# Patient Record
Sex: FEMALE | Race: OTHER | HISPANIC OR LATINO
[De-identification: names, ages, dates, MRNs, and addresses within clinical notes are randomized per-mention and may not be internally consistent; named-entity substitution may affect disease eponyms.]

---

## 2020-04-22 ENCOUNTER — APPOINTMENT (OUTPATIENT)
Dept: ANTEPARTUM | Facility: CLINIC | Age: 25
End: 2020-04-22
Payer: MEDICAID

## 2020-04-22 ENCOUNTER — ASOB RESULT (OUTPATIENT)
Age: 25
End: 2020-04-22

## 2020-04-22 PROBLEM — Z00.00 ENCOUNTER FOR PREVENTIVE HEALTH EXAMINATION: Status: ACTIVE | Noted: 2020-04-22

## 2020-04-22 PROCEDURE — 76801 OB US < 14 WKS SINGLE FETUS: CPT

## 2020-06-24 ENCOUNTER — APPOINTMENT (OUTPATIENT)
Dept: ANTEPARTUM | Facility: CLINIC | Age: 25
End: 2020-06-24
Payer: MEDICAID

## 2020-06-24 ENCOUNTER — ASOB RESULT (OUTPATIENT)
Age: 25
End: 2020-06-24

## 2020-06-24 PROCEDURE — 76805 OB US >/= 14 WKS SNGL FETUS: CPT

## 2020-07-27 ENCOUNTER — APPOINTMENT (OUTPATIENT)
Dept: MATERNAL FETAL MEDICINE | Facility: CLINIC | Age: 25
End: 2020-07-27
Payer: MEDICAID

## 2020-07-27 ENCOUNTER — APPOINTMENT (OUTPATIENT)
Dept: ANTEPARTUM | Facility: CLINIC | Age: 25
End: 2020-07-27

## 2020-07-27 VITALS
HEART RATE: 88 BPM | OXYGEN SATURATION: 99 % | HEIGHT: 59 IN | RESPIRATION RATE: 18 BRPM | WEIGHT: 175 LBS | BODY MASS INDEX: 35.28 KG/M2 | DIASTOLIC BLOOD PRESSURE: 58 MMHG | SYSTOLIC BLOOD PRESSURE: 90 MMHG

## 2020-07-27 DIAGNOSIS — O34.219 MATERNAL CARE FOR UNSPECIFIED TYPE SCAR FROM PREVIOUS CESAREAN DELIVERY: ICD-10-CM

## 2020-07-27 DIAGNOSIS — Z78.9 OTHER SPECIFIED HEALTH STATUS: ICD-10-CM

## 2020-07-27 DIAGNOSIS — Z86.59 PERSONAL HISTORY OF OTHER MENTAL AND BEHAVIORAL DISORDERS: ICD-10-CM

## 2020-07-27 DIAGNOSIS — Z86.39 PERSONAL HISTORY OF OTHER ENDOCRINE, NUTRITIONAL AND METABOLIC DISEASE: ICD-10-CM

## 2020-07-27 PROCEDURE — 99205 OFFICE O/P NEW HI 60 MIN: CPT | Mod: TH

## 2020-07-27 RX ORDER — PRENATAL VIT NO.130/IRON/FOLIC 27MG-0.8MG
28-0.8 TABLET ORAL DAILY
Refills: 0 | Status: ACTIVE | COMMUNITY
Start: 2020-07-27

## 2020-07-27 NOTE — PAST MEDICAL HISTORY
[HIV Infection] : no HIV [Chlamydial Infections] : no chlamydia [Exposure To Gonorrhea] : no gonorrhea [Syphilis] : no syphilis [Herpes Simplex] : no genital herpes [Human Papilloma Virus Infection] : no genital warts [Hepatitis, B Virus] : no Hepatitis B [Trichomoniasis] : no trichomoniasis [Hepatitis, C Virus] : no Hepatitis C

## 2020-07-27 NOTE — VITALS
[GA= ___ Days] : and [unfilled] day(s) [GA =___ Weeks] : which calculates to a GA of [unfilled] weeks [NINA by LMP (date): ___] : The calculated NINA by LMP is [unfilled] [By LMP] : this is the final NINA [LMP (date): ___] : LMP was on [unfilled]

## 2020-07-27 NOTE — FAMILY HISTORY
[Age 35+ During Pregnancy] : not 35 or over during pregnancy [Reported Family History Of Birth Defects] : no neural tube defect [Mark-Sachs Carrier] : no Mark-Sachs [Family History] : no mental retardation/autism [Reported Family History Of Genetic Disease] : no history of child defect in child of baby father

## 2020-07-27 NOTE — ACTIVE PROBLEMS
[Hypertension] : no hypertension [Diabetes Mellitus] : no diabetes mellitus [Autoimmune Disease] : no autoimmune disease [Heart Disease] : no heart disease [Neurologic Disorder] : no neurologic disorder, no epilepsy [Renal Disease] : no kidney disease, no UTI [Depression] : no depression, no post partum depression [Hepatic Disorder] : no hepatitis, no liver disease [Psychiatric Disorders] : no psychiatric disorders [Thrombophlebitis] : no varicosities, no phlebitis [Trauma] : no trauma/violence [Blood Transfusion (___ Ml)] : no history of blood transfusion

## 2020-07-28 LAB
T3FREE SERPL-MCNC: 2.36 PG/ML
T4 FREE SERPL-MCNC: 0.9 NG/DL

## 2020-07-28 NOTE — SURGICAL HISTORY
[Abn Paps] : no abnormal pap smears [Breast Disease] : no breast disease [Fibroids] : no fibroids [STI's] : no STI's [Infertility] : no infertility [OC Use] : no OC use [Cysts] : no cysts

## 2020-07-28 NOTE — DISCUSSION/SUMMARY
[FreeTextEntry1] : She is 25 weeks and 2 days gestation by her last menstrual period dates.\par \par She is overweight and obesity has been associated with a number of maternal complications such as gestational diabetes, pre-eclampsia, thrombophlebitis, labor abnormalities, post-term pregnancies,  delivery, and operative complications. Obesity has been associated with adverse fetal outcomes such as late stillbirth and  deliveries.  Obese women also have a two to three-fold increased incidence in congenital anomalies. There were no major fetal malformations seen during the fetal anatomy ultrasound examination. \par \par She told me that she was diagnosed with hypothyroidism approximately one year ago. She takes 50 mcg of levothyroxine daily. She had thyroid function test done on 2020. However, they did not order the correct tests for pregnancy.  She had a TSH level that was reported to be within normal limits, T3 uptake which was low (within normal limits for pregnancy , and a total T3 level that was reported to be within normal limits. I told her that pregnancies complicated by hypothyroidism are at an increased risk for stillbirths. She denies feeling tired or fatigue, having constipation, recent weight gain, feeling cold, and having dry skin. She was advised to have serial thyroid function studies during pregnancy to document that she is euthyroid or adjust her medication. I ordered a free T4, and free T3 level. She was advised to return in approximately 5 weeks for followup [facility] visit. \par \par She had a prior  section delivery. She was informed of the risks and benefits of a trial of labor. She was informed of the risk of uterine rupture and the associated maternal complications such as hysterectomy, blood transfusions, and intensive care unit admission. She was also informed of the associated  adverse outcomes in cases of uterine rupture such as stillbirth, fetal hypoxia and neurological impairment (cerebral palsy). She expressed a desire to have a repeat  section birth with the current pregnancy. \par \par Regarding her premature birth, I informed her that spontaneous  births are multifactorial since various conditions such as  labor,  rupture of membranes, multiple gestations, abruptio placenta, placenta previa, vaginal bleeding, increased or decreased amniotic fluid volume, fetal anomalies, chorioamnionitis, and incompetent cervix are often associated with  births. The clinical risk factors most often associated with spontaneous  birth are genital tract infection, vaginal bleeding during the second trimester, multiple gestations, and a history of previous  birth.  There are four pathologic pathways that have been associated with  labor and delivery.  The four pathways are maternal infection, maternal or fetal stress, overdistention of the uterus, and decidual hemorrhage.  She was advised to not have sexual intercourse or to use a condom during intercourse to reduce the chance of having another  delivery. She was advised to limit her activities. I told her to avoid standing for prolonged periods of time, avoid heavy lifting and bending. She was also advised to avoid stress during the course of the pregnancy. I gave her  labor precautions. She was advised to present to the hospital if she developed pelvic pressure, back pain, copious vaginal discharge, leaking of vaginal fluid, vaginal bleeding, abdominal tightening, and abdominal cramping pain.\par \par She was made aware that she is at increased risk for having another premature birth. She was informed of the availability of weekly progesterone injections to reduce her risk of having another premature infant. She was told that the progesterone injections did not provide complete protection against prematurity.  She was informed that the progesterone injections could produce mild side effects such as injection site soreness, swelling or bruising. I told her that the progesterone injections are usually started between 16 to 20 weeks of gestation and are continued up to 36 weeks of gestation or delivery. I also discussed the results of the Progestin's Role in Optimizing  Gestation (PROLONG) trial which was a double blind, placebo controlled, and international trial enrolling over 1,700 women. The rate of  births < 35 weeks did not differ between the progesterone and placebo groups (11% vs 11.5%; p = 0.7). The rate of  births < 37 weeks was also not significantly different between the progesterone and placebo groups (23.1% vs 21.9%). She was made aware that some medical insurance companies do not cover the cost of progesterone injections. She declined to receive the progesterone injections.  \par \par I recommend a glucola challenge test to screen for gestational diabetes between 24 and 28 weeks gestation. I also recommend the Tdap vaccine between 27 and 36 weeks of gestation to prevent pertussis infection in the  infant. I recommend the flu vaccine during flu season (October through May). She should have serial ultrasounds to evaluate fetal growth and development.  I also suggest fetal surveillance during the third trimester of pregnancy with weekly NSTs or BPPs starting at 36 weeks gestation.  She can also perform daily fetal movement counts as an adjunct to the NSTs or BPPs.\par \par

## 2020-07-28 NOTE — OB HISTORY
[Pregnancy History] : patient received anesthesia [___] : pregnancy complications occured [NINA: ___] : NINA: [unfilled] [LMP: ___] : LMP: [unfilled] [EGA: ___ wks] : EGA: [unfilled] wks [Sonogram] : sonogram [Spontaneous] : Spontaneous conception [at ___ wks] : at [unfilled] weeks [Definite:  ___ (Date)] : the last menstrual period was [unfilled] [Normal Amount/Duration] : was of a normal amount and duration [Menstrual Cramps] : menstrual cramps [Regular Cycle Intervals] : periods have been regular [FreeTextEntry1] : Her prenatal records were not available for review. Some of her prenatal labs were available for review. Thyroid function studies done on April 21, 2020 were available for review. The free T4 level was 0.85 which was at the lower limit of normal. The TSH level was 7.410 which was elevated.\par \par  [Spotting Between  Menses] : no spotting between menses

## 2020-08-31 ENCOUNTER — APPOINTMENT (OUTPATIENT)
Dept: ANTEPARTUM | Facility: CLINIC | Age: 25
End: 2020-08-31
Payer: MEDICAID

## 2020-08-31 ENCOUNTER — APPOINTMENT (OUTPATIENT)
Dept: MATERNAL FETAL MEDICINE | Facility: CLINIC | Age: 25
End: 2020-08-31
Payer: MEDICAID

## 2020-08-31 ENCOUNTER — ASOB RESULT (OUTPATIENT)
Age: 25
End: 2020-08-31

## 2020-08-31 VITALS
BODY MASS INDEX: 35.56 KG/M2 | OXYGEN SATURATION: 99 % | DIASTOLIC BLOOD PRESSURE: 58 MMHG | RESPIRATION RATE: 16 BRPM | SYSTOLIC BLOOD PRESSURE: 98 MMHG | HEART RATE: 87 BPM | HEIGHT: 59 IN | WEIGHT: 176.38 LBS

## 2020-08-31 PROCEDURE — 76816 OB US FOLLOW-UP PER FETUS: CPT

## 2020-08-31 PROCEDURE — 99214 OFFICE O/P EST MOD 30 MIN: CPT | Mod: TH

## 2020-08-31 NOTE — FAMILY HISTORY
[Age 35+ During Pregnancy] : not 35 or over during pregnancy [Reported Family History Of Birth Defects] : no congenital heart defects [Mark-Sachs Carrier] : no Mark-Sachs [Family History] : no mental retardation/autism [Reported Family History Of Genetic Disease] : no history of child defect in child of baby father

## 2020-08-31 NOTE — OB HISTORY
[Pregnancy History] : patient received anesthesia [___] : pregnancy complications occured [LMP: ___] : LMP: [unfilled] [NINA: ___] : NINA: [unfilled] [Spontaneous] : Spontaneous conception [Sonogram] : sonogram [at ___ wks] : at [unfilled] weeks [Definite:  ___ (Date)] : the last menstrual period was [unfilled] [Normal Amount/Duration] : was of a normal amount and duration [Regular Cycle Intervals] : periods have been regular [Menstrual Cramps] : menstrual cramps [EGA: ___ wks] : EGA: [unfilled] wks [FreeTextEntry1] : Her prenatal records were not available for review. Some of her prenatal labs were available for review. Thyroid function studies done on 2020 were available for review. The free T4 level was 0.85 which was at the lower limit of normal. The TSH level was 7.410 which was elevated. \par \par She had a maternal fetal medicine consultation with me on 2020 because of hypothyroidism, history of  delivery, obesity, and prior  delivery.\par \par  [Spotting Between  Menses] : no spotting between menses

## 2020-08-31 NOTE — VITALS
[LMP (date): ___] : LMP was on [unfilled] [GA= ___ Days] : and [unfilled] day(s) [NINA by LMP (date): ___] : The calculated NINA by LMP is [unfilled] [By LMP] : this is the final NINA [GA =___ Weeks] : which calculates to a GA of [unfilled] weeks

## 2020-08-31 NOTE — DISCUSSION/SUMMARY
[FreeTextEntry1] : She is 30 weeks and 2 days gestation by her last menstrual period dates.\par \par Regarding her hypothyroidism, she takes 50 mcg of levothyroxine daily.  I reviewed that thyroid function studies that I ordered on July 27, 2020. The free T4 was in the low normal range at 0.9. The free T3 was normal at 2.36. A TSH level done on July 21, 2020 was reported to be within normal limits. She denies feeling tired or fatigue, having constipation, recent weight gain, feeling cold, and having dry skin. She was advised to continue having serial thyroid function studies during pregnancy to document that she is euthyroid or adjust her medication. I believe she need more levothyroxine medication based on the most recent free T4 level. She was given a prescription for levothyroxine 75 mcg daily. I ordered a free T4, free T3 and TSH level. She was advised to return in approximately 6 weeks for a followup [facility] visit. All of her questions were answered.\par \par \par \par \par \par \par \par \par

## 2020-08-31 NOTE — ACTIVE PROBLEMS
[Hypertension] : no hypertension [Diabetes Mellitus] : no diabetes mellitus [Heart Disease] : no heart disease [Autoimmune Disease] : no autoimmune disease [Renal Disease] : no kidney disease, no UTI [Neurologic Disorder] : no neurologic disorder, no epilepsy [Psychiatric Disorders] : no psychiatric disorders [Hepatic Disorder] : no hepatitis, no liver disease [Depression] : no depression, no post partum depression [Thrombophlebitis] : no varicosities, no phlebitis [Trauma] : no trauma/violence [Blood Transfusion (___ Ml)] : no history of blood transfusion

## 2020-08-31 NOTE — SURGICAL HISTORY
[Fibroids] : no fibroids [Abn Paps] : no abnormal pap smears [STI's] : no STI's [Infertility] : no infertility [Breast Disease] : no breast disease [Cysts] : no cysts [OC Use] : no OC use

## 2020-09-01 LAB
T3FREE SERPL-MCNC: 2.64 PG/ML
T4 FREE SERPL-MCNC: 1 NG/DL
TSH SERPL-ACNC: 1 UIU/ML

## 2020-09-01 RX ORDER — LEVOTHYROXINE SODIUM 0.07 MG/1
75 TABLET ORAL DAILY
Qty: 30 | Refills: 2 | Status: ACTIVE | COMMUNITY
Start: 2020-09-01 | End: 1900-01-01

## 2020-09-09 RX ORDER — LEVOTHYROXINE SODIUM 0.07 MG/1
75 TABLET ORAL DAILY
Qty: 30 | Refills: 1 | Status: ACTIVE | COMMUNITY
Start: 2020-08-31 | End: 1900-01-01

## 2020-09-09 RX ORDER — LEVOTHYROXINE SODIUM 0.07 MG/1
75 TABLET ORAL DAILY
Qty: 30 | Refills: 1 | Status: ACTIVE | COMMUNITY
Start: 2020-07-27

## 2020-10-12 ENCOUNTER — APPOINTMENT (OUTPATIENT)
Dept: ANTEPARTUM | Facility: CLINIC | Age: 25
End: 2020-10-12

## 2020-10-12 ENCOUNTER — APPOINTMENT (OUTPATIENT)
Dept: MATERNAL FETAL MEDICINE | Facility: CLINIC | Age: 25
End: 2020-10-12

## 2020-10-21 ENCOUNTER — APPOINTMENT (OUTPATIENT)
Dept: ANTEPARTUM | Facility: CLINIC | Age: 25
End: 2020-10-21

## 2020-10-21 ENCOUNTER — APPOINTMENT (OUTPATIENT)
Dept: MATERNAL FETAL MEDICINE | Facility: CLINIC | Age: 25
End: 2020-10-21

## 2020-10-23 ENCOUNTER — OUTPATIENT (OUTPATIENT)
Dept: OUTPATIENT SERVICES | Facility: HOSPITAL | Age: 25
LOS: 1 days | End: 2020-10-23

## 2020-10-23 VITALS
HEART RATE: 80 BPM | RESPIRATION RATE: 20 BRPM | SYSTOLIC BLOOD PRESSURE: 90 MMHG | TEMPERATURE: 98 F | WEIGHT: 156.53 LBS | DIASTOLIC BLOOD PRESSURE: 50 MMHG | HEIGHT: 59 IN

## 2020-10-23 DIAGNOSIS — Z01.818 ENCOUNTER FOR OTHER PREPROCEDURAL EXAMINATION: ICD-10-CM

## 2020-10-23 DIAGNOSIS — Z98.890 OTHER SPECIFIED POSTPROCEDURAL STATES: Chronic | ICD-10-CM

## 2020-10-23 DIAGNOSIS — Z98.891 HISTORY OF UTERINE SCAR FROM PREVIOUS SURGERY: Chronic | ICD-10-CM

## 2020-10-23 LAB
BASOPHILS # BLD AUTO: 0.05 K/UL — SIGNIFICANT CHANGE UP (ref 0–0.2)
BASOPHILS NFR BLD AUTO: 0.5 % — SIGNIFICANT CHANGE UP (ref 0–2)
BLD GP AB SCN SERPL QL: SIGNIFICANT CHANGE UP
EOSINOPHIL # BLD AUTO: 0.09 K/UL — SIGNIFICANT CHANGE UP (ref 0–0.5)
EOSINOPHIL NFR BLD AUTO: 0.8 % — SIGNIFICANT CHANGE UP (ref 0–6)
HCT VFR BLD CALC: 33.4 % — LOW (ref 34.5–45)
HGB BLD-MCNC: 11.4 G/DL — LOW (ref 11.5–15.5)
IMM GRANULOCYTES NFR BLD AUTO: 0.9 % — SIGNIFICANT CHANGE UP (ref 0–1.5)
LYMPHOCYTES # BLD AUTO: 1.75 K/UL — SIGNIFICANT CHANGE UP (ref 1–3.3)
LYMPHOCYTES # BLD AUTO: 16.5 % — SIGNIFICANT CHANGE UP (ref 13–44)
MCHC RBC-ENTMCNC: 29.5 PG — SIGNIFICANT CHANGE UP (ref 27–34)
MCHC RBC-ENTMCNC: 34.1 GM/DL — SIGNIFICANT CHANGE UP (ref 32–36)
MCV RBC AUTO: 86.3 FL — SIGNIFICANT CHANGE UP (ref 80–100)
MONOCYTES # BLD AUTO: 0.71 K/UL — SIGNIFICANT CHANGE UP (ref 0–0.9)
MONOCYTES NFR BLD AUTO: 6.7 % — SIGNIFICANT CHANGE UP (ref 2–14)
NEUTROPHILS # BLD AUTO: 7.89 K/UL — HIGH (ref 1.8–7.4)
NEUTROPHILS NFR BLD AUTO: 74.6 % — SIGNIFICANT CHANGE UP (ref 43–77)
PLATELET # BLD AUTO: 223 K/UL — SIGNIFICANT CHANGE UP (ref 150–400)
RBC # BLD: 3.87 M/UL — SIGNIFICANT CHANGE UP (ref 3.8–5.2)
RBC # FLD: 13.5 % — SIGNIFICANT CHANGE UP (ref 10.3–14.5)
WBC # BLD: 10.59 K/UL — HIGH (ref 3.8–10.5)
WBC # FLD AUTO: 10.59 K/UL — HIGH (ref 3.8–10.5)

## 2020-10-23 RX ORDER — LEVOTHYROXINE SODIUM 125 MCG
1 TABLET ORAL
Qty: 0 | Refills: 0 | DISCHARGE

## 2020-10-29 ENCOUNTER — OUTPATIENT (OUTPATIENT)
Dept: OUTPATIENT SERVICES | Facility: HOSPITAL | Age: 25
LOS: 1 days | End: 2020-10-29
Payer: COMMERCIAL

## 2020-10-29 DIAGNOSIS — Z11.59 ENCOUNTER FOR SCREENING FOR OTHER VIRAL DISEASES: ICD-10-CM

## 2020-10-29 DIAGNOSIS — Z98.891 HISTORY OF UTERINE SCAR FROM PREVIOUS SURGERY: Chronic | ICD-10-CM

## 2020-10-29 DIAGNOSIS — Z98.890 OTHER SPECIFIED POSTPROCEDURAL STATES: Chronic | ICD-10-CM

## 2020-10-29 LAB — SARS-COV-2 RNA SPEC QL NAA+PROBE: SIGNIFICANT CHANGE UP

## 2020-10-29 PROCEDURE — U0003: CPT

## 2020-10-30 ENCOUNTER — TRANSCRIPTION ENCOUNTER (OUTPATIENT)
Age: 25
End: 2020-10-30

## 2020-10-31 ENCOUNTER — TRANSCRIPTION ENCOUNTER (OUTPATIENT)
Age: 25
End: 2020-10-31

## 2020-10-31 ENCOUNTER — INPATIENT (INPATIENT)
Facility: HOSPITAL | Age: 25
LOS: 1 days | Discharge: HOME | End: 2020-11-02
Attending: OBSTETRICS & GYNECOLOGY | Admitting: OBSTETRICS & GYNECOLOGY
Payer: COMMERCIAL

## 2020-10-31 VITALS
HEART RATE: 96 BPM | RESPIRATION RATE: 18 BRPM | DIASTOLIC BLOOD PRESSURE: 70 MMHG | TEMPERATURE: 98 F | SYSTOLIC BLOOD PRESSURE: 115 MMHG

## 2020-10-31 DIAGNOSIS — O34.219 MATERNAL CARE FOR UNSPECIFIED TYPE SCAR FROM PREVIOUS CESAREAN DELIVERY: ICD-10-CM

## 2020-10-31 DIAGNOSIS — Z98.890 OTHER SPECIFIED POSTPROCEDURAL STATES: Chronic | ICD-10-CM

## 2020-10-31 DIAGNOSIS — Z98.891 HISTORY OF UTERINE SCAR FROM PREVIOUS SURGERY: Chronic | ICD-10-CM

## 2020-10-31 LAB
BLD GP AB SCN SERPL QL: SIGNIFICANT CHANGE UP
SARS-COV-2 IGG SERPL QL IA: POSITIVE
SARS-COV-2 IGM SERPL IA-ACNC: 11.3 INDEX — HIGH

## 2020-10-31 PROCEDURE — 59514 CESAREAN DELIVERY ONLY: CPT | Mod: U9,GC

## 2020-10-31 PROCEDURE — 59514 CESAREAN DELIVERY ONLY: CPT | Mod: 80,U9

## 2020-10-31 RX ORDER — SODIUM CHLORIDE 9 MG/ML
1000 INJECTION, SOLUTION INTRAVENOUS
Refills: 0 | Status: DISCONTINUED | OUTPATIENT
Start: 2020-10-31 | End: 2020-10-31

## 2020-10-31 RX ORDER — SODIUM CHLORIDE 9 MG/ML
1000 INJECTION, SOLUTION INTRAVENOUS ONCE
Refills: 0 | Status: DISCONTINUED | OUTPATIENT
Start: 2020-10-31 | End: 2020-10-31

## 2020-10-31 RX ORDER — OXYCODONE HYDROCHLORIDE 5 MG/1
5 TABLET ORAL ONCE
Refills: 0 | Status: DISCONTINUED | OUTPATIENT
Start: 2020-10-31 | End: 2020-11-02

## 2020-10-31 RX ORDER — DIPHENHYDRAMINE HCL 50 MG
25 CAPSULE ORAL EVERY 6 HOURS
Refills: 0 | Status: DISCONTINUED | OUTPATIENT
Start: 2020-10-31 | End: 2020-11-02

## 2020-10-31 RX ORDER — FAMOTIDINE 10 MG/ML
20 INJECTION INTRAVENOUS ONCE
Refills: 0 | Status: COMPLETED | OUTPATIENT
Start: 2020-10-31 | End: 2020-10-31

## 2020-10-31 RX ORDER — OXYCODONE HYDROCHLORIDE 5 MG/1
5 TABLET ORAL
Refills: 0 | Status: DISCONTINUED | OUTPATIENT
Start: 2020-10-31 | End: 2020-11-02

## 2020-10-31 RX ORDER — LANOLIN
1 OINTMENT (GRAM) TOPICAL EVERY 6 HOURS
Refills: 0 | Status: DISCONTINUED | OUTPATIENT
Start: 2020-10-31 | End: 2020-11-02

## 2020-10-31 RX ORDER — OXYTOCIN 10 UNIT/ML
333.33 VIAL (ML) INJECTION
Qty: 20 | Refills: 0 | Status: DISCONTINUED | OUTPATIENT
Start: 2020-10-31 | End: 2020-11-02

## 2020-10-31 RX ORDER — INFLUENZA VIRUS VACCINE 15; 15; 15; 15 UG/.5ML; UG/.5ML; UG/.5ML; UG/.5ML
0.5 SUSPENSION INTRAMUSCULAR ONCE
Refills: 0 | Status: COMPLETED | OUTPATIENT
Start: 2020-10-31 | End: 2020-11-02

## 2020-10-31 RX ORDER — NALBUPHINE HYDROCHLORIDE 10 MG/ML
2.5 INJECTION, SOLUTION INTRAMUSCULAR; INTRAVENOUS; SUBCUTANEOUS EVERY 6 HOURS
Refills: 0 | Status: DISCONTINUED | OUTPATIENT
Start: 2020-10-31 | End: 2020-11-02

## 2020-10-31 RX ORDER — KETOROLAC TROMETHAMINE 30 MG/ML
30 SYRINGE (ML) INJECTION EVERY 6 HOURS
Refills: 0 | Status: DISCONTINUED | OUTPATIENT
Start: 2020-10-31 | End: 2020-11-02

## 2020-10-31 RX ORDER — CITRIC ACID/SODIUM CITRATE 300-500 MG
30 SOLUTION, ORAL ORAL ONCE
Refills: 0 | Status: COMPLETED | OUTPATIENT
Start: 2020-10-31 | End: 2020-10-31

## 2020-10-31 RX ORDER — IBUPROFEN 200 MG
600 TABLET ORAL EVERY 6 HOURS
Refills: 0 | Status: COMPLETED | OUTPATIENT
Start: 2020-10-31 | End: 2021-09-29

## 2020-10-31 RX ORDER — DIPHENHYDRAMINE HCL 50 MG
25 CAPSULE ORAL EVERY 4 HOURS
Refills: 0 | Status: DISCONTINUED | OUTPATIENT
Start: 2020-10-31 | End: 2020-11-02

## 2020-10-31 RX ORDER — CEFAZOLIN SODIUM 1 G
2000 VIAL (EA) INJECTION ONCE
Refills: 0 | Status: COMPLETED | OUTPATIENT
Start: 2020-10-31 | End: 2020-10-31

## 2020-10-31 RX ORDER — ENOXAPARIN SODIUM 100 MG/ML
40 INJECTION SUBCUTANEOUS EVERY 24 HOURS
Refills: 0 | Status: DISCONTINUED | OUTPATIENT
Start: 2020-10-31 | End: 2020-11-02

## 2020-10-31 RX ORDER — MAGNESIUM HYDROXIDE 400 MG/1
30 TABLET, CHEWABLE ORAL
Refills: 0 | Status: DISCONTINUED | OUTPATIENT
Start: 2020-10-31 | End: 2020-11-02

## 2020-10-31 RX ORDER — METOCLOPRAMIDE HCL 10 MG
10 TABLET ORAL ONCE
Refills: 0 | Status: DISCONTINUED | OUTPATIENT
Start: 2020-10-31 | End: 2020-10-31

## 2020-10-31 RX ORDER — SIMETHICONE 80 MG/1
80 TABLET, CHEWABLE ORAL EVERY 4 HOURS
Refills: 0 | Status: DISCONTINUED | OUTPATIENT
Start: 2020-10-31 | End: 2020-11-02

## 2020-10-31 RX ORDER — DEXAMETHASONE 0.5 MG/5ML
4 ELIXIR ORAL EVERY 6 HOURS
Refills: 0 | Status: DISCONTINUED | OUTPATIENT
Start: 2020-10-31 | End: 2020-11-02

## 2020-10-31 RX ORDER — SODIUM CHLORIDE 9 MG/ML
1000 INJECTION, SOLUTION INTRAVENOUS
Refills: 0 | Status: DISCONTINUED | OUTPATIENT
Start: 2020-10-31 | End: 2020-11-02

## 2020-10-31 RX ORDER — NALOXONE HYDROCHLORIDE 4 MG/.1ML
0.1 SPRAY NASAL
Refills: 0 | Status: DISCONTINUED | OUTPATIENT
Start: 2020-10-31 | End: 2020-11-02

## 2020-10-31 RX ORDER — ACETAMINOPHEN 500 MG
975 TABLET ORAL
Refills: 0 | Status: DISCONTINUED | OUTPATIENT
Start: 2020-10-31 | End: 2020-11-02

## 2020-10-31 RX ORDER — ONDANSETRON 8 MG/1
4 TABLET, FILM COATED ORAL EVERY 6 HOURS
Refills: 0 | Status: DISCONTINUED | OUTPATIENT
Start: 2020-10-31 | End: 2020-11-02

## 2020-10-31 RX ORDER — TETANUS TOXOID, REDUCED DIPHTHERIA TOXOID AND ACELLULAR PERTUSSIS VACCINE, ADSORBED 5; 2.5; 8; 8; 2.5 [IU]/.5ML; [IU]/.5ML; UG/.5ML; UG/.5ML; UG/.5ML
0.5 SUSPENSION INTRAMUSCULAR ONCE
Refills: 0 | Status: COMPLETED | OUTPATIENT
Start: 2020-10-31

## 2020-10-31 RX ADMIN — Medication 30 MILLIGRAM(S): at 18:20

## 2020-10-31 RX ADMIN — Medication 100 MILLIGRAM(S): at 10:15

## 2020-10-31 RX ADMIN — Medication 975 MILLIGRAM(S): at 21:13

## 2020-10-31 RX ADMIN — Medication 30 MILLIGRAM(S): at 12:47

## 2020-10-31 RX ADMIN — ENOXAPARIN SODIUM 40 MILLIGRAM(S): 100 INJECTION SUBCUTANEOUS at 20:32

## 2020-10-31 RX ADMIN — SODIUM CHLORIDE 125 MILLILITER(S): 9 INJECTION, SOLUTION INTRAVENOUS at 08:31

## 2020-10-31 RX ADMIN — Medication 1000 MILLIUNIT(S)/MIN: at 11:45

## 2020-10-31 RX ADMIN — FAMOTIDINE 20 MILLIGRAM(S): 10 INJECTION INTRAVENOUS at 08:32

## 2020-10-31 RX ADMIN — ONDANSETRON 4 MILLIGRAM(S): 8 TABLET, FILM COATED ORAL at 20:16

## 2020-10-31 RX ADMIN — Medication 975 MILLIGRAM(S): at 20:16

## 2020-10-31 RX ADMIN — SODIUM CHLORIDE 125 MILLILITER(S): 9 INJECTION, SOLUTION INTRAVENOUS at 08:00

## 2020-10-31 RX ADMIN — Medication 1000 MILLIUNIT(S)/MIN: at 10:51

## 2020-10-31 RX ADMIN — Medication 30 MILLILITER(S): at 08:29

## 2020-10-31 NOTE — OB PROVIDER H&P - ASSESSMENT
Patient is a 25 year old  at 39wks who presents to L&D for repeat .   -NST reactive on admission   -PST testing done, Hgb 11.4; COVID negative; Rubella I; O+   -Admit to L&D for routine labs  -Consent to be obtained by Dr. Berg

## 2020-10-31 NOTE — DISCHARGE NOTE OB - PROVIDER TOKENS
FREE:[LAST:[[facility]],PHONE:[phone]],FAX:[phone]   [phone]    -],ADDRESS:[[address] [place] [address]],FOLLOWUP:[1 week]]

## 2020-10-31 NOTE — DISCHARGE NOTE OB - HOSPITAL COURSE
delivered via repeat  section. She was transferred to postpartum unit without complications during her stay. Upon discharge she is voiding, tolerating PO, ambulating, and pain is controlled.

## 2020-10-31 NOTE — OB PROVIDER DELIVERY SUMMARY - NSPROVIDERDELIVERYNOTE_OBGYN_ALL_OB_FT
Patient delivered viable male infant via Pfannenstiel vaccuum assisted repeat  section. Abdomen closed in layers. Skin closed subcuticular. APGAR 9/9. Weight 7lb 9oz 3430g       Patient delivered viable male infant via Pfannenstiel vaccuum assisted (1 pull, no popoffs) repeat  section. Abdomen closed in layers. Skin closed subcuticular. APGAR 9/9. Weight 7lb 9oz 3430g

## 2020-10-31 NOTE — DISCHARGE NOTE OB - CARE PROVIDER_API CALL
[facility] [place],   [address] [place] [address]  Phone: [phone]  Fax: (   )    -  Follow Up Time: 1 week

## 2020-10-31 NOTE — OB PROVIDER H&P - NS_EDDCALCULATED_OBGYN_ALL_OB
Telephone Encounter by India Longoria RN at 02/06/18 10:30 AM     Author:  India Longoria RN Service:  (none) Author Type:  Registered Nurse     Filed:  02/06/18 10:30 AM Encounter Date:  2/6/2018 Status:  Signed     :  India Longoria RN (Registered Nurse)            To Clinical to order meds at new mail order[DC1.1M]      Revision History        User Key Date/Time User Provider Type Action    > DC1.1 02/06/18 10:30 AM India Longoria RN Registered Nurse Sign    M - Manual             LMP General Sunscreen Counseling: I recommended a broad spectrum sunscreen with a SPF of 30 or higher.  I explained that SPF 30 sunscreens block approximately 97 percent of the sun's harmful rays.  Sunscreens should be applied at least 15 minutes prior to expected sun exposure and then every 2 hours after that as long as sun exposure continues. If swimming or exercising sunscreen should be reapplied every 45 minutes to an hour after getting wet or sweating.  One ounce, or the equivalent of a shot glass full of sunscreen, is adequate to protect the skin not covered by a bathing suit. I also recommended a lip balm with a sunscreen as well. Sun protective clothing can be used in lieu of sunscreen but must be worn the entire time you are exposed to the sun's rays. Detail Level: Detailed

## 2020-10-31 NOTE — DISCHARGE NOTE OB - PATIENT PORTAL LINK FT
You can access the FollowMyHealth Patient Portal offered by [facility] by registering at the following website: [email or web address]. By joining [facility]’s FollowMyHealth portal, you will also be able to view your health information using other applications (apps) compatible with our system.

## 2020-10-31 NOTE — DISCHARGE NOTE OB - MEDICATION SUMMARY - MEDICATIONS TO TAKE
I will START or STAY ON the medications listed below when I get home from the hospital:    prenatal vitamins  -- orally once a day  -- Indication: For Continue home medication    acetaminophen 325 mg oral tablet  -- 3 tab(s) by mouth every 8 hours   -- Indication: For as needed for pain    ibuprofen 600 mg oral tablet  -- 1 tab(s) by mouth every 6 hours, As needed, Mild Pain (1 - 3)  -- Indication: For as needed for pain    levothyroxine 50 mcg (0.05 mg) oral tablet  -- 1 tab(s) by mouth once a day  -- Indication: For Continue home medication

## 2020-10-31 NOTE — DISCHARGE NOTE OB - CARE PLAN
Principal Discharge DX:	 delivery delivered  Goal:	rapid recovery  Assessment and plan of treatment:	Assessment and Plan of Treatment: Please call your provider to schedule postoperative wound check visit in 1-2 weeks. Take medications as directed, regular diet, activity as tolerated. Exclusive breast feeding for the first 6 months is recommended. Nothing per vagina for 6 weeks (incl. sex, douching, etc). If you have additional concerns, please inform your provider.  Secondary Diagnosis:	Hypothyroid

## 2020-10-31 NOTE — OB PROVIDER H&P - HISTORY OF PRESENT ILLNESS
Patient is a 25 year old  at 39wks who presents to L&D for repeat .   NINA: 2020 LMP: 2020     Pregnancy course complicated by   1. Obesity   2. Hypothyroidism     Obhx:   G1- 2017; pC/S at 36wks due to oligohydramnios and blood infection; [id number]     Pmhx: H-pylori, Hypothyroidism     Pshx: Hernia repair ()     Meds: Levothyroxine 50mcg     Allergies: NKDA     BMI: 36.6     Ultrasound: Vertex, anterior placenta ()

## 2020-10-31 NOTE — OB RN PATIENT PROFILE - DOMESTIC TRAVEL HIGH RISK QUESTION
FAMILY PRACTICE OFFICE VISIT       NAME: Veronique Allan  AGE: 66 y o  SEX: male       : 1955        MRN: [id number]        Assessment and Plan     Problem List Items Addressed This Visit        Endocrine    Diabetes mellitus type 2 in obese ([address] ) - Primary    Relevant Medications    metFORMIN (GLUCOPHAGE-XR) 500 mg 24 hr tablet    Other Relevant Orders    Comprehensive metabolic panel    Lipid panel       Cardiovascular and Mediastinum    Essential hypertension    Relevant Medications    lisinopril (ZESTRIL) 10 mg tablet    Other Relevant Orders    Comprehensive metabolic panel    Lipid panel    TSH, 3rd generation with Free T4 reflex       Other    Hyperlipidemia      Other Visit Diagnoses     Pre-op exam        Relevant Orders    POCT urine dip auto non-scope (Completed)    Osteoarthritis of left knee, unspecified osteoarthritis type        Refused influenza vaccine        Relevant Orders    PREFERRED: influenza vaccine, 8636-4835, quadrivalent, recombinant, PF, 0 5 mL, for patients 18 yr+ (FLUBLOK)    Tinea pedis of both feet        Relevant Medications    ketoconazole (NIZORAL) 2 % cream    Encounter for prostate cancer screening        Relevant Orders    PSA, Total Screen    Need for hepatitis C screening test        Relevant Orders    Hepatitis C antibody       Patient presents for preop evaluation prior to left total knee replacement surgery  He was not seen in the office for 2 years and has discontinued medications for type 2 diabetes and hypertension  Recent blood work performed at [facility] and reveals hemoglobin A1c of 11 6  Patient's blood pressure is elevated, EKG reveals sinus tachycardia with no ischemic changes  Significant weight loss within past 2 years  Patient denies symptoms of chest pain, palpitations or dyspnea  At this time, he is not a suitable candidate for left total knee replacement    Will start him on metformin  mg daily and will increase dose by 500 mg every 3 days to the total dose of 1500 mg per day  Patient will start Accu-Cheks at home  He will repeat blood work and schedule follow-up in 2 weeks  Will start lisinopril 10 mg once a day for hypertension  Patient will proceed with additional blood work including TSH, lipid panel, LFTs as well as follow-up on BMP  Will add screening studies for PSA and hepatitis C antibody  Patient is due for diabetic eye exam   He will start Nizoral cream for rash of tenia pedis  I have spent 40 minutes with Patient  today in which greater than 50% of this time was spent in counseling/coordination of care regarding Diagnostic results, Prognosis, Risks and benefits of tx options, Intructions for management, Patient and family education, Importance of tx compliance, Risk factor reductions and Impressions  There are no Patient Instructions on file for this visit  M*[employer] software was used to dictate this note  It may contain errors with dictating incorrect words/spelling  Please contact provider directly with any questions  Chief Complaint     Chief Complaint   Patient presents with    Pre-op Exam    Annual Exam       History of Present Illness     Patient presents for preop evaluation  He was not seen in the office for almost 2 years  He lost insurance and was not able to follow-up for chronic medical conditions including type 2 diabetes and hypertension  Patient has not been using any medications for quite a while  He is here today for preop evaluation due to forthcoming surgery in April at [facility] after left knee injury in November  Patient is scheduled for surgery on April 11th  Pre-admission testing was performed at [facility] and  Patient has been feeling generally well aside from daily arthritic pain in his knee  He did notice significant weight loss within past few months  He denies abdominal pain, nausea vomiting or dyspepsia    Patient denies symptoms of chest pain, palpitations, shortness of breath or dizziness  No numbness, tingling or paresthesias in his feet, no visual changes  Results of blood work performed at [facility] and discussed  Hemoglobin A1c is high at 11 6, sodium level 132, glucose 487, CBC is normal, LFTs were not performed  EKG performed as part of preop evaluation reveals sinus tachycardia at 105 beats per minute, no ischemic changes  Patient lost 70+ lb since our last office visit in March of 2017  Patient states that he also probable lost approximately 30 lb within past few weeks  He admits to normal appetite and no diarrhea or vomiting  Review of Systems   Review of Systems   Constitutional: Positive for unexpected weight change  HENT: Negative  Eyes: Negative  Respiratory: Negative  Cardiovascular: Negative  Gastrointestinal: Negative  Endocrine: Negative  Genitourinary: Negative  Musculoskeletal: Positive for arthralgias  Skin: Negative  Allergic/Immunologic: Negative  Neurological: Negative  Hematological: Negative  Psychiatric/Behavioral: Negative  Active Problem List     Patient Active Problem List   Diagnosis    Essential hypertension    Hyperlipidemia    Diabetes mellitus type 2 in obese ([address] )    Vitamin D deficiency       Past Medical History:  Past Medical History:   Diagnosis Date    Measles, [place] (rubella)     Mumps     Snoring     Loudly       Past Surgical History:  No past surgical history on file      Family History:  Family History   Problem Relation Age of Onset    Ovarian cancer Mother     Heart disease Mother     Diabetes type II Mother     Colon cancer Father     Heart disease Father     Nephrolithiasis Father        Social History:  Social History     Socioeconomic History    Marital status: Single     Spouse name: Not on file    Number of children: Not on file    Years of education: Not on file    Highest education level: Not on file   Occupational History    Not on file Social Needs    Financial resource strain: Not on file    Food insecurity:     Worry: Not on file     Inability: Not on file    Transportation needs:     Medical: Not on file     Non-medical: Not on file   Tobacco Use    Smoking status: Never Smoker    Smokeless tobacco: Never Used   Substance and Sexual Activity    Alcohol use: Yes     Comment: Occasionally, not in excess - As per [employer]     Drug use: Never    Sexual activity: Not on file   Lifestyle    Physical activity:     Days per week: Not on file     Minutes per session: Not on file    Stress: Not on file   Relationships    Social connections:     Talks on phone: Not on file     Gets together: Not on file     Attends Latter-day service: Not on file     Active member of club or organization: Not on file     Attends meetings of clubs or organizations: Not on file     Relationship status: Not on file    Intimate partner violence:     Fear of current or ex partner: Not on file     Emotionally abused: Not on file     Physically abused: Not on file     Forced sexual activity: Not on file   Other Topics Concern    Not on file   Social History Narrative    Not on file     PHQ-9 Depression Screening    PHQ-9:    Frequency of the following problems over the past two weeks:       Little interest or pleasure in doing things:  0 - not at all  Feeling down, depressed, or hopeless:  0 - not at all  PHQ-2 Score:  0               Objective     Vitals:    03/20/19 1033 03/20/19 1054 03/20/19 1116   BP: 132/90 134/90 140/88   Pulse: 96     Resp: 16     Temp: 98 1 °F (36 7 °C)     TempSrc: Tympanic     Weight: 107 kg (235 lb 12 8 oz)     Height: 5' 5" (1 651 m)       Wt Readings from Last 3 Encounters:   03/20/19 107 kg (235 lb 12 8 oz)   03/22/17 (!) 139 kg (307 lb 4 oz)   08/03/16 (!) 141 kg (311 lb)       Physical Exam   Constitutional: He is oriented to person, place, and time  He appears well-developed and well-nourished     HENT:   Head: Normocephalic and atraumatic  Eyes: Conjunctivae are normal    Neck: Neck supple  Carotid bruit is not present  Cardiovascular: Regular rhythm and normal heart sounds  No extrasystoles are present  Tachycardia present  Pulses are no weak pulses  No murmur heard  Pulses:       Dorsalis pedis pulses are 1+ on the right side, and 1+ on the left side  Pulmonary/Chest: Effort normal and breath sounds normal  No respiratory distress  He has no wheezes  He has no rales  Abdominal: Bowel sounds are normal  He exhibits no distension and no abdominal bruit  Musculoskeletal: He exhibits deformity  He exhibits no edema (No lower extremity edema, no calf tenderness)  Tenderness:  arthritic deformities bilateral knees  Feet:    Antalgic gait   Feet:   Right Foot:   Skin Integrity: Positive for erythema ( medial surface)  Negative for ulcer, skin breakdown, warmth or callus  Left Foot:   Skin Integrity: Positive for erythema (medial  surface)  Negative for ulcer, skin breakdown, warmth or callus  Neurological: He is alert and oriented to person, place, and time  No cranial nerve deficit  Skin: Rash (Tenia pedis rash bilateral feet) noted  Psychiatric: He has a normal mood and affect  His behavior is normal    Nursing note and vitals reviewed  Patient's shoes and socks removed  Right Foot/Ankle   Right Foot Inspection  Skin Exam: skin normal, skin intact and erythema ( medial surface) no warmth, no callus, no maceration, no abnormal color, no pre-ulcer, no ulcer and no callus                          Toe Exam: ROM and strength within normal limits  Sensory   Vibration: intact  Proprioception: intact   Monofilament testing: intact  Vascular    The right DP pulse is 1+       Left Foot/Ankle  Left Foot Inspection  Skin Exam: skin normal, skin intact and erythema (medial  surface)no warmth, no maceration, normal color, no pre-ulcer, no ulcer and no callus                         Toe Exam: ROM and strength within normal limits                   Sensory   Vibration: intact  Proprioception: intact  Monofilament: intact  Vascular    The left DP pulse is 1+  Assign Risk Category:  No deformity present; Loss of protective sensation;  No weak pulses       Risk: 1          Pertinent Laboratory/Diagnostic Studies:  Lab Results   Component Value Date    BUN 10 03/17/2017    CREATININE 0 88 03/17/2017    CALCIUM 9 4 03/17/2017     03/17/2017    K 4 0 03/17/2017    CO2 23 03/17/2017     03/17/2017     Lab Results   Component Value Date    ALT 11 03/17/2017    AST 11 03/17/2017    ALKPHOS 87 03/17/2017    BILITOT 0 8 03/17/2017       Lab Results   Component Value Date    WBC 7 9 03/17/2017    HGB 15 4 03/17/2017    HCT 47 1 03/17/2017    MCV 90 6 03/17/2017     03/17/2017       No results found for: TSH    Lab Results   Component Value Date    CHOL 218 (H) 03/17/2017     Lab Results   Component Value Date    TRIG 118 03/17/2017     Lab Results   Component Value Date    HDL 42 03/17/2017     No results found for: [address]  Lab Results   Component Value Date    HGBA1C 7 0 (H) 03/17/2017       Results for orders placed or performed in visit on 03/20/19   POCT urine dip auto non-scope   Result Value Ref Range     COLOR,UA yellow     CLARITY,UA clear     SPECIFIC GRAVITY,UA 1 000      PH,UA 5 0     LEUKOCYTE ESTERASE,UA -     NITRITE,UA -     GLUCOSE, UA 2,000     KETONES,UA -     BILIRUBIN,UA -     BLOOD,UA -     POCT URINE PROTEIN -     SL AMB POCT UROBILINOGEN 0 2        Orders Placed This Encounter   Procedures    PREFERRED: influenza vaccine, [id number], quadrivalent, recombinant, PF, 0 5 mL, for patients 18 yr+ (FLUBLOK)    Comprehensive metabolic panel    Lipid panel    PSA, Total Screen    TSH, 3rd generation with Free T4 reflex    Hepatitis C antibody    POCT urine dip auto non-scope       ALLERGIES:  No Known Allergies    Current Medications     Current Outpatient Medications   Medication Sig Dispense Refill    acetaminophen (TYLENOL) 500 mg tablet Take 500 mg by mouth every 6 (six) hours as needed      aspirin (ECOTRIN LOW STRENGTH) 81 mg EC tablet Take 1 tablet by mouth daily      ketoconazole (NIZORAL) 2 % cream Apply to  Both feet  Once a day 60 g 1    lisinopril (ZESTRIL) 10 mg tablet Take 1 tablet (10 mg total) by mouth daily 90 tablet 3    metFORMIN (GLUCOPHAGE-XR) 500 mg 24 hr tablet Take 1 tablet once a day for 3 days then 2 tablets once a day for 3 days then use 3 tablets once a day 270 tablet 1     No current facility-administered medications for this visit            Health Maintenance     Health Maintenance   Topic Date Due    Hepatitis C Screening  1955    CRC Screening: Colonoscopy  1955    DM Eye Exam  05/12/1967    BMI: Followup Plan  05/12/1975    HEPATITIS B VACCINES (1 of 3 - Risk 3-dose series) 05/12/1976    Pneumococcal PPSV23 Medium Risk Adult (1 of 1 - PPSV23) 05/12/1976    DTaP,Tdap,and Td Vaccines (2 - Td) 01/01/2011    INFLUENZA VACCINE  03/20/2020 (Originally 7/1/2018)    HEMOGLOBIN A1C  09/19/2019    Depression Screening PHQ  03/20/2020    BMI: Adult  03/20/2020    Diabetic Foot Exam  03/20/2020     Immunization History   Administered Date(s) Administered    Influenza Quadrivalent Preservative Free 3 years and older IM 08/03/2016    Tdap 01/01/2001       Ludwig Tello MD No

## 2020-10-31 NOTE — OB RN DELIVERY SUMMARY - NS_SEPSISRSKCALC_OBGYN_ALL_OB_FT
No temperature has been documented for this patient in CPN or on the OB Flowsheet. Ensure the highest temperature during labor was documented on the OB Flowsheet.  GBS status in the 'Prenatal Lab tests/results section' on the OB RN Patient Profile must be documented.   GBS status in the 'Prenatal Lab tests/results section' on the OB RN Patient Profile must be documented.   EOS calculated successfully. EOS Risk Factor: 0.5/1000 live births ([facility] national incidence); GA=39w;Temp=98.24; ROM=0.05; GBS='Negative'; Antibiotics='No antibiotics or any antibiotics < 2 hrs prior to birth'

## 2020-10-31 NOTE — OB NEONATOLOGY/PEDIATRICIAN DELIVERY SUMMARY - NSPEDSNEONOTESA_OBGYN_ALL_OB_FT
Requested by Dr Berg to attend a scheduled RC/S of a 26 y/o  mom at 39 weeks GA.  She had + PNC, is O pos, HIV neg, HBsAg neg, RPR NR, Rubella Imm, GBS neg, COVID 19 neg, hx of hypothyroid on Levothyroxine.   L&D:  AROM at delivery.  Baby born vertex with good cry, transferred to warmer, orally suctioned, dried, and examined.  Baby showed to father and then transferred to mom.      A/P:  FT male  Transition to Regular Nursery under Peds Hospitalist care  Baby needs TFT's at 1 week of life Requested by Dr Berg to attend a scheduled RC/S of a 24 y/o  mom at 39 weeks GA.  She had + PNC, is O pos, HIV neg, HBsAg neg, RPR NR, Rubella Imm, GBS neg, COVID 19 neg, hx of hypothyroid on Levothyroxine.   L&D:  AROM at delivery, + meconium stained amniotic fluid.  Baby born vertex with vacuum assist, good cry, transferred to warmer, orally suctioned, dried, and examined.  Baby showed to father and then transferred to mom.      A/P:  FT male  Monitor for respiratory distress.  Transition to Regular Nursery under Peds Hospitalist care.  Obtain TFT's at 1 week of age.   Baby needs TFT's at 1 week of life

## 2020-10-31 NOTE — OB RN PATIENT PROFILE - NS_PAINMANGEPLANS_OBGYN_ALL_OB
Chief complaint:   Chief Complaint   Patient presents with   • Pre-Op Exam       Vitals:  Visit Vitals  /80   Pulse 72   Temp 98.9 °F (37.2 °C) (Tympanic)   Resp 16   Ht 5' 1\" (1.549 m)   Wt 76.7 kg   BMI 31.93 kg/m²             HISTORY OF PRESENT ILLNESS     Patient presents for a pre-operative H and P. She is a patient of Dr. Campos.  will be doing her right cataract extraction with an IOL implant on October 13, 2017. She states she feels well. Two weeks later, she will have the other eye done. States asthma and allergies are in good control.         Other significant problems:  Patient Active Problem List    Diagnosis Date Noted   • Cataract, nuclear sclerotic senile 07/23/2015     Priority: Low   • Anisometropia 07/23/2015     Priority: Low   • Congenital retinal pigment epithelial hypertrophy 07/21/2014     Priority: Low   • NS (nuclear sclerosis) 07/21/2014     Priority: Low   • Myopia with astigmatism and presbyopia 07/21/2014     Priority: Low   • Hyperopia with astigmatism and presbyopia 07/21/2014     Priority: Low   • Osteopenia      Priority: Low   • Migraine      Priority: Low   • Hypothyroidism      Priority: Low   • HTN (hypertension)      Priority: Low   • Asthma      Priority: Low   • Allergic rhinitis      Priority: Low   • Environmental allergies      Priority: Low       PAST MEDICAL, FAMILY AND SOCIAL HISTORY     Medications:  Current Outpatient Prescriptions   Medication   • acetaZOLAMIDE (DIAMOX) 250 MG tablet   • tazarotene (TAZORAC) 0.05 % gel   • clindamycin-benzoyl peroxide 1.2-5 % gel   • albuterol 108 (90 Base) MCG/ACT inhaler   • fexofenadine (ALLEGRA) 180 MG tablet   • VITAMIN B COMPLEX-C PO   • enalapril (VASOTEC) 2.5 MG tablet   • levothyroxine (SYNTHROID, LEVOTHROID) 50 MCG tablet   • mometasone-formoterol (DULERA) 100-5 MCG/ACT inhaler   • butalbital-aspirin-caffeine (FIORINAL) capsule   • LORazepam (ATIVAN) 0.5 MG tablet   • Calcium-Vitamin D 500-125 MG-UNIT TABS      No current facility-administered medications for this visit.        Allergies:  ALLERGIES:   Allergen Reactions   • Dander Other (See Comments)     sneezing       Past Medical  History/Surgeries:  Past Medical History:   Diagnosis Date   • Anxiety    • Asthma    • Cataract    • HTN (hypertension)    • Hypothyroidism    • Migraine    • Migraines    • Osteoporosis, unspecified 08/11/2010       Past Surgical History:   Procedure Laterality Date   • BREAST SURGERY     • COLONOSCOPY  3-25-15   • COLONOSCOPY DIAGNOSTIC  03/28/2005   • D AND C  01/01/1991    D&C   • DEXA BONE DENSITY AXIAL SKELETON  08/11/2010   • HYSTERECTOMY     • MAMMO SCREENING BILATERAL  01/21/2013   • TONSILLECTOMY AND ADENOIDECTOMY         Family History:  Family History   Problem Relation Age of Onset   • Macular degeneration Father    • Cataracts Father        Social History:  Social History   Substance Use Topics   • Smoking status: Never Smoker   • Smokeless tobacco: Never Used   • Alcohol use No       REVIEW OF SYSTEMS     Review of Systems   Constitutional: Negative.    HENT: Negative.    Eyes: Negative.    Respiratory: Negative.    Cardiovascular: Negative.    Gastrointestinal: Negative.    Endocrine: Negative.    Genitourinary: Negative.    Musculoskeletal: Negative.    Skin: Negative.    Allergic/Immunologic: Negative.    Neurological: Negative.    Hematological: Negative.    Psychiatric/Behavioral: Negative.        PHYSICAL EXAM     Physical Exam   Constitutional: She is oriented to person, place, and time. She appears well-developed and well-nourished. No distress.   HENT:   Head: Normocephalic and atraumatic.   Right Ear: External ear normal.   Left Ear: External ear normal.   Nose: Nose normal.   Mouth/Throat: Oropharynx is clear and moist. No oropharyngeal exudate.   Neck: Normal range of motion. Neck supple. No thyromegaly present.   Cardiovascular: Normal rate and regular rhythm.    No murmur heard.  Pulmonary/Chest: Effort  normal and breath sounds normal. She has no wheezes.   Abdominal: Soft. Bowel sounds are normal. There is no tenderness.   Lymphadenopathy:     She has no cervical adenopathy.   Neurological: She is alert and oriented to person, place, and time.   Skin: Skin is warm and dry. She is not diaphoretic.   Psychiatric: She has a normal mood and affect. Her behavior is normal. Judgment and thought content normal.       ASSESSMENT/PLAN     ASSESSMENT:  1. Need for vaccination    2. Allergic rhinitis, unspecified chronicity, unspecified seasonality, unspecified trigger    3. Osteopenia, unspecified location    4. Migraine with aura and without status migrainosus, not intractable    5. Hypothyroidism, unspecified type    6. Essential hypertension    7. Environmental allergies        PLAN:  Orders Placed This Encounter   • Influenza Quadrivalent Split 0.5 mL MDV ([id number])     Patient has no contraindications to the proposed anesthesia for the upcoming surgery.  Return if symptoms worsen or fail to improve.     Cc. Dr. Margarito Vizcarra  [facility]   Other

## 2020-10-31 NOTE — OB PROVIDER H&P - NSHPPHYSICALEXAM_GEN_ALL_CORE
Vital Signs Last 24 Hrs  HR: 96 (31 Oct 2020 07:45) (96 - 96)  BP: 115/70 (31 Oct 2020 07:45) (115/70 - 115/70)    CV: RRR   Lungs: CTA   Abdomen: soft, nontender   Extrem: no calf tenderness or swelling

## 2020-11-01 LAB
BASOPHILS # BLD AUTO: 0.06 K/UL — SIGNIFICANT CHANGE UP (ref 0–0.2)
BASOPHILS NFR BLD AUTO: 0.5 % — SIGNIFICANT CHANGE UP (ref 0–2)
EOSINOPHIL # BLD AUTO: 0.05 K/UL — SIGNIFICANT CHANGE UP (ref 0–0.5)
EOSINOPHIL NFR BLD AUTO: 0.4 % — SIGNIFICANT CHANGE UP (ref 0–6)
HCT VFR BLD CALC: 30.8 % — LOW (ref 34.5–45)
HGB BLD-MCNC: 10.6 G/DL — LOW (ref 11.5–15.5)
IMM GRANULOCYTES NFR BLD AUTO: 0.4 % — SIGNIFICANT CHANGE UP (ref 0–1.5)
LYMPHOCYTES # BLD AUTO: 1.98 K/UL — SIGNIFICANT CHANGE UP (ref 1–3.3)
LYMPHOCYTES # BLD AUTO: 16.4 % — SIGNIFICANT CHANGE UP (ref 13–44)
MCHC RBC-ENTMCNC: 29.9 PG — SIGNIFICANT CHANGE UP (ref 27–34)
MCHC RBC-ENTMCNC: 34.4 GM/DL — SIGNIFICANT CHANGE UP (ref 32–36)
MCV RBC AUTO: 87 FL — SIGNIFICANT CHANGE UP (ref 80–100)
MONOCYTES # BLD AUTO: 1.06 K/UL — HIGH (ref 0–0.9)
MONOCYTES NFR BLD AUTO: 8.8 % — SIGNIFICANT CHANGE UP (ref 2–14)
NEUTROPHILS # BLD AUTO: 8.89 K/UL — HIGH (ref 1.8–7.4)
NEUTROPHILS NFR BLD AUTO: 73.5 % — SIGNIFICANT CHANGE UP (ref 43–77)
PLATELET # BLD AUTO: 213 K/UL — SIGNIFICANT CHANGE UP (ref 150–400)
RBC # BLD: 3.54 M/UL — LOW (ref 3.8–5.2)
RBC # FLD: 13.5 % — SIGNIFICANT CHANGE UP (ref 10.3–14.5)
WBC # BLD: 12.09 K/UL — HIGH (ref 3.8–10.5)
WBC # FLD AUTO: 12.09 K/UL — HIGH (ref 3.8–10.5)

## 2020-11-01 RX ORDER — IBUPROFEN 200 MG
600 TABLET ORAL EVERY 6 HOURS
Refills: 0 | Status: DISCONTINUED | OUTPATIENT
Start: 2020-11-01 | End: 2020-11-02

## 2020-11-01 RX ORDER — IBUPROFEN 200 MG
1 TABLET ORAL
Qty: 56 | Refills: 0
Start: 2020-11-01 | End: 2020-11-14

## 2020-11-01 RX ORDER — ACETAMINOPHEN 500 MG
3 TABLET ORAL
Qty: 126 | Refills: 0
Start: 2020-11-01 | End: 2020-11-14

## 2020-11-01 RX ORDER — LEVOTHYROXINE SODIUM 125 MCG
50 TABLET ORAL DAILY
Refills: 0 | Status: DISCONTINUED | OUTPATIENT
Start: 2020-11-01 | End: 2020-11-02

## 2020-11-01 RX ADMIN — Medication 30 MILLIGRAM(S): at 00:30

## 2020-11-01 RX ADMIN — Medication 30 MILLIGRAM(S): at 18:09

## 2020-11-01 RX ADMIN — Medication 30 MILLIGRAM(S): at 12:20

## 2020-11-01 RX ADMIN — Medication 30 MILLIGRAM(S): at 17:54

## 2020-11-01 RX ADMIN — ENOXAPARIN SODIUM 40 MILLIGRAM(S): 100 INJECTION SUBCUTANEOUS at 21:18

## 2020-11-01 RX ADMIN — Medication 30 MILLIGRAM(S): at 06:16

## 2020-11-01 RX ADMIN — Medication 30 MILLIGRAM(S): at 12:35

## 2020-11-01 RX ADMIN — Medication 30 MILLIGRAM(S): at 00:15

## 2020-11-01 RX ADMIN — Medication 30 MILLIGRAM(S): at 06:01

## 2020-11-01 RX ADMIN — Medication 975 MILLIGRAM(S): at 22:00

## 2020-11-01 RX ADMIN — Medication 975 MILLIGRAM(S): at 21:18

## 2020-11-01 NOTE — PROGRESS NOTE ADULT - SUBJECTIVE AND OBJECTIVE BOX
Name: ROSA ANDRADE  MRN: [id number]  Date Admitted: 10-31-20  Location: [facility] 2EST 2009 ([facility] 2EST)  Attending: Erum Patel      Post Partum Note:     ROSA ANDRADE is a 25y  s/p repeat Vacuum assisted  section POD #1. Viable male infant.     SUBJECTIVE:  No acute events overnight. Pain is well controlled with PRN pain medication. No problems with ambulating, voiding, or PO intake. Has had flatus and BM. Denies N/V. Patient is having norm lochia which is decreasing.    She is breastfeeding and the baby is latching on.     OBJECTIVE:    Vital Signs Last 24 Hrs  T(C): 36.9 (2020 04:32), Max: 36.9 (31 Oct 2020 21:10)  T(F): 98.4 (2020 04:32), Max: 98.5 (31 Oct 2020 21:10)  HR: 88 (2020 04:32) (78 - 98)  BP: 98/61 (2020 04:32) (97/68 - 124/84)  BP(mean): --  RR: 18 (2020 04:32) (13 - 18)  SpO2: 99% (2020 04:32) (96% - 99%)    Physical exam:  General: AOx3, NAD.  Heart: RRR. S1S2.  Lungs: CTABL. Good airflow bilaterally.   Abdomen: +BS, Soft, appropriately tender, nondistended, no guarding or rebound tenderness, firm uterine fundus at umbilicus, the incision is clean dry and intact with ______. No erythema or discharge.  Ext: No DVT signs, warm extremities.        LABS:        ROSA ANDRADE is a 25y [id number] s/p uncomplicated CS POD #XX due to ______.  -Pain controlled on current medications  -Tolerating po,   - +/- flatus  - +/- void  - hgb    -->                : Plan   -  +/- Lovenox for DVT prophylaxis   - Rh -/+   Rhogam received/ordered  - Pt with male infant wants/denies circumscision  - Pt with female infant   - Dispo: Home pending attending approval/Continue post op care   Name: ROSA ANDRADE  MRN: [id number]  Date Admitted: 10-31-20  Location: [facility] 2E2009 ([facility] 2EST)  Attending: Erum Patel      Post Partum Note:     ROSA ANDRADE is a 25y  s/p repeat Vacuum assisted  section POD #1. Viable male infant.     SUBJECTIVE:  No acute events overnight. Pain is well controlled with PRN pain medication. No problems with ambulating, voiding, or PO intake. Has had flatus and BM. Denies N/V. Patient is having norm lochia which is decreasing.    She is breastfeeding and the baby is latching on.     OBJECTIVE:    Vital Signs Last 24 Hrs  T(C): 36.9 (2020 04:32), Max: 36.9 (31 Oct 2020 21:10)  T(F): 98.4 (2020 04:32), Max: 98.5 (31 Oct 2020 21:10)  HR: 88 (2020 04:32) (78 - 98)  BP: 98/61 (2020 04:32) (97/68 - 124/84)  RR: 18 (2020 04:32) (13 - 18)  SpO2: 99% (2020 04:32) (96% - 99%)    Physical exam:  General: AOx3, NAD.  Heart: RRR. S1S2.  Lungs: CTABL. Good airflow bilaterally.   Abdomen: +BS, Soft, appropriately tender, nondistended, no guarding or rebound tenderness, firm uterine fundus at umbilicus, the incision is clean dry and intact with steri-strips. No erythema or discharge.  Ext: No DVT signs, warm extremities.        LABS:  am cbc pending       Name: ROSA ANDRADE  MRN: [id number]  Date Admitted: 10-31-20  Location: [facility] 2EST 2009 ([facility] 2EST)  Attending: Erum Patel      Post Partum Note:     ROSA ANDRADE is a 25y  s/p repeat Vacuum assisted  section POD #1. Viable male infant.     SUBJECTIVE:  No acute events overnight. Pain is well controlled with PRN pain medication. No problems with ambulating, voiding, or PO intake. Has had flatus and BM. Denies N/V. Patient is having norm lochia which is decreasing.    She is breastfeeding and the baby is latching on.     OBJECTIVE:    Vital Signs Last 24 Hrs  T(C): 36.9 (2020 04:32), Max: 36.9 (31 Oct 2020 21:10)  T(F): 98.4 (2020 04:32), Max: 98.5 (31 Oct 2020 21:10)  HR: 88 (2020 04:32) (78 - 98)  BP: 98/61 (2020 04:32) (97/68 - 124/84)  RR: 18 (2020 04:32) (13 - 18)  SpO2: 99% (2020 04:32) (96% - 99%)    Physical exam:  General: AOx3, NAD.  Heart: RRR. S1S2.  Lungs: CTABL. Good airflow bilaterally.   Abdomen: +BS, Soft, appropriately tender, nondistended, no guarding or rebound tenderness, firm uterine fundus at umbilicus, the incision is clean dry and intact with steri-strips. No erythema or discharge.  Ext: No DVT signs, warm extremities.        LABS:                        10.6   12.09 )-----------( 213      ( 2020 07:11 )             30.8

## 2020-11-01 NOTE — PROGRESS NOTE ADULT - ASSESSMENT
ROSA ANDRADE is a 25y  s/p repeat Vacuum assisted  section POD #1. Viable male infant.   - Pain controlled on current medications  - Tolerating po   - + flatus  - + void  - hgb  11.4  --> am cbc pending  - Lovenox for DVT prophylaxis   - Rh +  - Pt with male infant, doesn't desire circumcision  - Dispo: Continue post op care   ROSA ANDRADE is a 25y  s/p repeat Vacuum assisted  section POD #1. Viable male infant.   - Pain controlled on current medications  - Tolerating po   - + flatus  - + void  - hgb  11.4  --> 10.6  - Lovenox for DVT prophylaxis   - Rh +  - Pt with male infant, doesn't desire circumcision  - Dispo: Continue post op care

## 2020-11-01 NOTE — PROGRESS NOTE ADULT - SUBJECTIVE AND OBJECTIVE BOX
Subjective:  The patient feels well.  She is tolerating regular diet.  She denies nausea and vomiting.  Her pain is controlled.  She reports normal postpartum bleeding.      Physical exam:    Vital Signs Last 24 Hrs  T(C): 36.9 (01 Nov 2020 04:32), Max: 36.9 (31 Oct 2020 21:10)  T(F): 98.4 (01 Nov 2020 04:32), Max: 98.5 (31 Oct 2020 21:10)  HR: 88 (01 Nov 2020 04:32) (78 - 98)  BP: 98/61 (01 Nov 2020 04:32) (97/68 - 124/84)  BP(mean): --  RR: 18 (01 Nov 2020 04:32) (13 - 18)  SpO2: 99% (01 Nov 2020 04:32) (96% - 99%)    Gen: NAD  Breast: Soft, nontender, not engorged.  Abdomen: Soft, nontender, no distension , firm uterine fundus at umbilicus.  Incision: Clean, dry, and intact with steri strips  Pelvic: Normal lochia noted  Ext: No calf tenderness    SCD's on bilateral lower extremities    LABS:    Antibody Screen: NEG (10-31 @ 08:34)      POD # 1  Doing well.  Routine PO care.        .

## 2020-11-02 VITALS
DIASTOLIC BLOOD PRESSURE: 62 MMHG | HEART RATE: 78 BPM | TEMPERATURE: 98 F | SYSTOLIC BLOOD PRESSURE: 107 MMHG | OXYGEN SATURATION: 99 % | RESPIRATION RATE: 16 BRPM

## 2020-11-02 DIAGNOSIS — E03.9 HYPOTHYROIDISM, UNSPECIFIED: ICD-10-CM

## 2020-11-02 LAB — T PALLIDUM AB TITR SER: NEGATIVE — SIGNIFICANT CHANGE UP

## 2020-11-02 RX ORDER — TETANUS TOXOID, REDUCED DIPHTHERIA TOXOID AND ACELLULAR PERTUSSIS VACCINE, ADSORBED 5; 2.5; 8; 8; 2.5 [IU]/.5ML; [IU]/.5ML; UG/.5ML; UG/.5ML; UG/.5ML
0.5 SUSPENSION INTRAMUSCULAR ONCE
Refills: 0 | Status: COMPLETED | OUTPATIENT
Start: 2020-11-02 | End: 2020-11-02

## 2020-11-02 RX ADMIN — INFLUENZA VIRUS VACCINE 0.5 MILLILITER(S): 15; 15; 15; 15 SUSPENSION INTRAMUSCULAR at 05:25

## 2020-11-02 RX ADMIN — Medication 600 MILLIGRAM(S): at 12:40

## 2020-11-02 RX ADMIN — Medication 50 MICROGRAM(S): at 05:24

## 2020-11-02 RX ADMIN — Medication 975 MILLIGRAM(S): at 16:28

## 2020-11-02 RX ADMIN — Medication 975 MILLIGRAM(S): at 09:50

## 2020-11-02 RX ADMIN — Medication 600 MILLIGRAM(S): at 05:24

## 2020-11-02 RX ADMIN — Medication 975 MILLIGRAM(S): at 09:17

## 2020-11-02 RX ADMIN — Medication 30 MILLIGRAM(S): at 00:25

## 2020-11-02 RX ADMIN — Medication 30 MILLIGRAM(S): at 00:10

## 2020-11-02 RX ADMIN — Medication 600 MILLIGRAM(S): at 05:54

## 2020-11-02 RX ADMIN — Medication 600 MILLIGRAM(S): at 11:41

## 2020-11-02 RX ADMIN — TETANUS TOXOID, REDUCED DIPHTHERIA TOXOID AND ACELLULAR PERTUSSIS VACCINE, ADSORBED 0.5 MILLILITER(S): 5; 2.5; 8; 8; 2.5 SUSPENSION INTRAMUSCULAR at 05:24

## 2020-11-02 RX ADMIN — Medication 975 MILLIGRAM(S): at 15:38

## 2020-11-02 NOTE — PROGRESS NOTE ADULT - SUBJECTIVE AND OBJECTIVE BOX
ROSA ANDRADE is a 24yo  now POD#2 s/p vacuum-assisted repeat  section at 39 weeks gestation.    S:    The patient has no complaints. Pain controlled with medication   She is ambulating without difficulty and tolerating PO.   + flatus/-BM/+ voiding     O:    T(C): 36.7 (20 @ 03:10), Max: 36.9 (20 @ 19:35)  HR: 78 (20 @ 03:10) (74 - 86)  BP: 107/62 (20 @ 03:10) (96/61 - 109/72)  RR: 16 (20 @ 03:10) (16 - 20)  SpO2: 99% (20 @ 03:10) (96% - 99%)      Gen: NAD, AOx3  CV: RRR  Pulm: CTAB  Breast: nontender, not engorged   Abdomen:  soft, non-tender, non-distended, +bowel sounds.  Uterus:  Fundus firm below umbilicus  Incision:  Clean/intact with steri-strips in place, wet serosanguinous discharge on steri  VE:  +lochia  Ext:  Non-tender, no edema                           10.6   12.09 )-----------( 213      ( 2020 07:11 )             30.8

## 2020-11-02 NOTE — PROGRESS NOTE ADULT - ASSESSMENT
A/P:  26yo  now POD#2 s/p vacuum-assisted repeat  section at 39 weeks gestation.  -Vital Signs Stable  -Voiding, tolerating PO, bowel function nml   -Advance care as tolerated   -Continue routine postpartum/postoperative care and education.  -Ambulation, DVT prophylaxis lovenox  -Healthy male infant, currently under bilirubin lights, declines circumcision.  -Patient desires to go home today    Hypothyroidism  - continue taking home dose of levothyroxine 50mcg

## 2020-11-03 ENCOUNTER — RESULT REVIEW (OUTPATIENT)
Age: 25
End: 2020-11-03

## 2020-11-03 PROCEDURE — 86769 SARS-COV-2 COVID-19 ANTIBODY: CPT

## 2020-11-03 PROCEDURE — 90686 IIV4 VACC NO PRSV 0.5 ML IM: CPT

## 2020-11-03 PROCEDURE — T1013: CPT

## 2020-11-03 PROCEDURE — 36415 COLL VENOUS BLD VENIPUNCTURE: CPT

## 2020-11-03 PROCEDURE — 86901 BLOOD TYPING SEROLOGIC RH(D): CPT

## 2020-11-03 PROCEDURE — 86780 TREPONEMA PALLIDUM: CPT

## 2020-11-03 PROCEDURE — 59050 FETAL MONITOR W/REPORT: CPT

## 2020-11-03 PROCEDURE — 86900 BLOOD TYPING SEROLOGIC ABO: CPT

## 2020-11-03 PROCEDURE — 85025 COMPLETE CBC W/AUTO DIFF WBC: CPT

## 2020-11-03 PROCEDURE — 90715 TDAP VACCINE 7 YRS/> IM: CPT

## 2020-11-03 PROCEDURE — 86850 RBC ANTIBODY SCREEN: CPT

## 2020-11-03 PROCEDURE — 59025 FETAL NON-STRESS TEST: CPT

## 2020-11-03 PROCEDURE — 88307 TISSUE EXAM BY PATHOLOGIST: CPT

## 2020-11-03 PROCEDURE — 88307 TISSUE EXAM BY PATHOLOGIST: CPT | Mod: 26

## 2020-11-09 LAB — SURGICAL PATHOLOGY STUDY: SIGNIFICANT CHANGE UP

## 2022-05-17 ENCOUNTER — EMERGENCY (EMERGENCY)
Facility: HOSPITAL | Age: 27
LOS: 1 days | Discharge: HOME | End: 2022-05-17
Attending: EMERGENCY MEDICINE
Payer: COMMERCIAL

## 2022-05-17 VITALS
DIASTOLIC BLOOD PRESSURE: 71 MMHG | SYSTOLIC BLOOD PRESSURE: 118 MMHG | OXYGEN SATURATION: 98 % | HEART RATE: 134 BPM | TEMPERATURE: 101 F | WEIGHT: 199.96 LBS | HEIGHT: 59 IN | RESPIRATION RATE: 20 BRPM

## 2022-05-17 DIAGNOSIS — Z98.890 OTHER SPECIFIED POSTPROCEDURAL STATES: Chronic | ICD-10-CM

## 2022-05-17 DIAGNOSIS — Z98.891 HISTORY OF UTERINE SCAR FROM PREVIOUS SURGERY: Chronic | ICD-10-CM

## 2022-05-17 PROBLEM — E03.9 HYPOTHYROIDISM, UNSPECIFIED: Chronic | Status: ACTIVE | Noted: 2020-10-23

## 2022-05-17 LAB
FLUAV H1 2009 PAND RNA SPEC QL NAA+PROBE: DETECTED
FLUAV SUBTYP SPEC NAA+PROBE: DETECTED
RAPID RVP RESULT: DETECTED
SARS-COV-2 RNA SPEC QL NAA+PROBE: SIGNIFICANT CHANGE UP

## 2022-05-17 PROCEDURE — 99284 EMERGENCY DEPT VISIT MOD MDM: CPT

## 2022-05-17 PROCEDURE — 0225U NFCT DS DNA&RNA 21 SARSCOV2: CPT

## 2022-05-17 PROCEDURE — 99283 EMERGENCY DEPT VISIT LOW MDM: CPT

## 2022-05-17 RX ORDER — IBUPROFEN 200 MG
600 TABLET ORAL ONCE
Refills: 0 | Status: COMPLETED | OUTPATIENT
Start: 2022-05-17 | End: 2022-05-17

## 2022-05-17 RX ADMIN — Medication 600 MILLIGRAM(S): at 08:10

## 2022-05-17 NOTE — ED STATDOCS - CLINICAL SUMMARY MEDICAL DECISION MAKING FREE TEXT BOX
Most likely viral illness. Febrile. Will give Tylenol, last dose at 1800 last night. Well appearing. Swab and DC.

## 2022-05-17 NOTE — ED STATDOCS - PATIENT PORTAL LINK FT
You can access the FollowMyHealth Patient Portal offered by [facility] by registering at the following website: [email or web address]. By joining [email or web address]’s FollowMyHealth portal, you will also be able to view your health information using other applications (apps) compatible with our system.

## 2022-05-17 NOTE — ED STATDOCS - NSFOLLOWUPINSTRUCTIONS_ED_ALL_ED_FT
regrese al servicio de urgencias si los síntomas empeoran, fiebre/escalofríos, náuseas/vómitos, debilidad, dificultad para respirar. seguimiento con pmd según sea necesario dentro de 1 semana

## 2022-05-17 NOTE — ED STATDOCS - OBJECTIVE STATEMENT
26y female with a PMHx of hypothyroid (taking synthroid) presents to the ED c/o headache, generalized body aches, fevers, sore throat sudden onset x3 days. Pt also endorses secondary complaints of dry cough. Pt reports her son had an ear infection at home and then she began to not feel well.     Denies /n/v/cp/sob/palpitations/ rash/dizziness/abd.pain/d/c/dysuria/hematuria /ear pain/recent travels.   [phone] for swab.  : Eliane 26y female with a PMHx of hypothyroid (taking synthroid) presents to the ED c/o headache, generalized body aches, fevers, sore throat  x3 days. Pt also endorses secondary complaints of dry cough. Pt reports her son had an ear infection and sick fever fever/cough at home and then she began to not feel well.     Denies /n/v/cp/sob/palpitations/ rash/dizziness/abd.pain/d/c/dysuria/hematuria /ear pain/recent travels.   [phone] for swab.  : Eliane

## 2023-01-01 NOTE — PROGRESS NOTE ADULT - ATTENDING COMMENTS
s/p repeat c section   uncomplicated  post op course  no complaints  pain well controlled  abd soft, ND, mild tenderness, wound covered with steristrip stained with blood in middle, no active bleeding or discharge seen  ext no edema  breast Normal   breast feeding   baby under phototherapy   plan   possible DC home if baby is discharged  post op care discussed
see provider note

## 2023-12-13 ENCOUNTER — APPOINTMENT (OUTPATIENT)
Dept: OBGYN | Facility: CLINIC | Age: 28
End: 2023-12-13
Payer: MEDICAID

## 2023-12-13 VITALS
HEIGHT: 59 IN | SYSTOLIC BLOOD PRESSURE: 100 MMHG | BODY MASS INDEX: 36.69 KG/M2 | DIASTOLIC BLOOD PRESSURE: 72 MMHG | WEIGHT: 182 LBS

## 2023-12-13 DIAGNOSIS — Z3A.30 30 WEEKS GESTATION OF PREGNANCY: ICD-10-CM

## 2023-12-13 DIAGNOSIS — E03.9 ENDOCRINE, NUTRITIONAL AND METABOLIC DISEASES COMPLICATING PREGNANCY, THIRD TRIMESTER: ICD-10-CM

## 2023-12-13 DIAGNOSIS — E66.9 OBESITY, UNSPECIFIED: ICD-10-CM

## 2023-12-13 DIAGNOSIS — O09.893 SUPERVISION OF OTHER HIGH RISK PREGNANCIES, THIRD TRIMESTER: ICD-10-CM

## 2023-12-13 DIAGNOSIS — O99.283 ENDOCRINE, NUTRITIONAL AND METABOLIC DISEASES COMPLICATING PREGNANCY, THIRD TRIMESTER: ICD-10-CM

## 2023-12-13 DIAGNOSIS — O99.213 OBESITY COMPLICATING PREGNANCY, THIRD TRIMESTER: ICD-10-CM

## 2023-12-13 DIAGNOSIS — Z11.3 ENCOUNTER FOR SCREENING FOR INFECTIONS WITH A PREDOMINANTLY SEXUAL MODE OF TRANSMISSION: ICD-10-CM

## 2023-12-13 DIAGNOSIS — Z12.4 ENCOUNTER FOR SCREENING FOR MALIGNANT NEOPLASM OF CERVIX: ICD-10-CM

## 2023-12-13 PROCEDURE — 99385 PREV VISIT NEW AGE 18-39: CPT

## 2023-12-13 NOTE — PHYSICAL EXAM
[Chaperone Present] : A chaperone was present in the examining room during all aspects of the physical examination [Appropriately responsive] : appropriately responsive [Alert] : alert [No Acute Distress] : no acute distress [Soft] : soft [Non-tender] : non-tender [Non-distended] : non-distended [No HSM] : No HSM [No Lesions] : no lesions [No Mass] : no mass [Oriented x3] : oriented x3 [Examination Of The Breasts] : a normal appearance [No Masses] : no breast masses were palpable [Labia Majora] : normal [Labia Minora] : normal [Normal] : normal [Uterine Adnexae] : normal [FreeTextEntry1] : Jo

## 2023-12-13 NOTE — HISTORY OF PRESENT ILLNESS
[N] : Patient does not use contraception [Y] : Positive pregnancy history [Regular Cycle Intervals] : periods have been regular [Frequency: Q ___ days] : menstrual periods occur approximately every [unfilled] days [Menarche Age: ____] : age at menarche was [unfilled] [FreeTextEntry1] : 27yo  presents to establish care. PMHX hypothyriod on levothyroxine. Denies any GI/ complaints. Desires STI testing. Sexually active uses condoms for contraception and happy with this method. Also tracks her mentrsual cycle LMP 2023 [PGHxTotal] : 2 [[facility]FullTerm] : 2 [PGHxPremature] : 0 [PGHxAbortions] : 0 [BannerxLiving] : 2 [PGHxABInduced] : 0 [PGHxABSpont] : 0 [PGHxEctopic] : 0 [PGHxMultBirths] : 0

## 2023-12-13 NOTE — PLAN
[FreeTextEntry1] : PLAN  Normal annual GYN exam Discussed weight loss with patient. Discussed smaller portions and excercise daily  STI screening - obtained  will call with any abnormal results  RTO 1 yr or PRN

## 2023-12-13 NOTE — COUNSELING
[Nutrition/ Exercise/ Weight Management] : nutrition, exercise, weight management [Body Image] : body image [Vitamins/Supplements] : vitamins/supplements [Breast Self Exam] : breast self exam [Contraception/ Emergency Contraception/ Safe Sexual Practices] : contraception, emergency contraception, safe sexual practices [Confidentiality] : confidentiality [STD (testing, results, tx)] : STD (testing, results, tx) [Lab Results] : lab results [Medication Management] : medication management

## 2023-12-14 LAB
C TRACH RRNA SPEC QL NAA+PROBE: NOT DETECTED
HBV SURFACE AG SER QL: NONREACTIVE
HCV AB SER QL: NONREACTIVE
HCV S/CO RATIO: 0.14 S/CO
HIV1+2 AB SPEC QL IA.RAPID: NONREACTIVE
HPV HIGH+LOW RISK DNA PNL CVX: NOT DETECTED
N GONORRHOEA RRNA SPEC QL NAA+PROBE: NOT DETECTED
SOURCE TP AMPLIFICATION: NORMAL
T PALLIDUM AB SER QL IA: NEGATIVE

## 2023-12-20 LAB — CYTOLOGY CVX/VAG DOC THIN PREP: NORMAL

## 2025-04-14 ENCOUNTER — APPOINTMENT (OUTPATIENT)
Dept: OBGYN | Facility: CLINIC | Age: 30
End: 2025-04-14
Payer: COMMERCIAL

## 2025-04-14 VITALS
BODY MASS INDEX: 36.29 KG/M2 | HEIGHT: 59 IN | DIASTOLIC BLOOD PRESSURE: 70 MMHG | SYSTOLIC BLOOD PRESSURE: 108 MMHG | WEIGHT: 180 LBS

## 2025-04-14 DIAGNOSIS — Z30.2 ENCOUNTER FOR STERILIZATION: ICD-10-CM

## 2025-04-14 DIAGNOSIS — Z01.411 ENCOUNTER FOR GYNECOLOGICAL EXAMINATION (GENERAL) (ROUTINE) WITH ABNORMAL FINDINGS: ICD-10-CM

## 2025-04-14 PROCEDURE — 99395 PREV VISIT EST AGE 18-39: CPT

## 2025-04-14 PROCEDURE — 99459 PELVIC EXAMINATION: CPT

## 2025-04-15 LAB — HPV HIGH+LOW RISK DNA PNL CVX: NOT DETECTED

## 2025-04-18 LAB — CYTOLOGY CVX/VAG DOC THIN PREP: ABNORMAL

## 2025-05-19 ENCOUNTER — APPOINTMENT (OUTPATIENT)
Dept: OBGYN | Facility: CLINIC | Age: 30
End: 2025-05-19
Payer: MEDICAID

## 2025-05-19 VITALS
BODY MASS INDEX: 36.55 KG/M2 | SYSTOLIC BLOOD PRESSURE: 104 MMHG | HEIGHT: 59 IN | DIASTOLIC BLOOD PRESSURE: 72 MMHG | WEIGHT: 181.31 LBS

## 2025-05-19 DIAGNOSIS — Z30.09 ENCOUNTER FOR OTHER GENERAL COUNSELING AND ADVICE ON CONTRACEPTION: ICD-10-CM

## 2025-05-19 PROCEDURE — 99204 OFFICE O/P NEW MOD 45 MIN: CPT

## 2025-05-20 PROBLEM — Z30.09 STERILIZATION CONSULT: Status: ACTIVE | Noted: 2025-05-20
